# Patient Record
Sex: MALE | Race: WHITE | NOT HISPANIC OR LATINO | Employment: FULL TIME | URBAN - METROPOLITAN AREA
[De-identification: names, ages, dates, MRNs, and addresses within clinical notes are randomized per-mention and may not be internally consistent; named-entity substitution may affect disease eponyms.]

---

## 2020-10-02 ENCOUNTER — VIRTUAL VISIT (OUTPATIENT)
Dept: FAMILY MEDICINE | Facility: OTHER | Age: 35
End: 2020-10-02

## 2020-10-05 ENCOUNTER — AMBULATORY - HEALTHEAST (OUTPATIENT)
Dept: FAMILY MEDICINE | Facility: CLINIC | Age: 35
End: 2020-10-05

## 2020-10-05 DIAGNOSIS — Z20.822 SUSPECTED COVID-19 VIRUS INFECTION: ICD-10-CM

## 2020-10-07 NOTE — PROGRESS NOTES
"Date: 10/02/2020 20:31:13  Clinician: Latasha Todd  Clinician NPI: [id number]  Patient: Isaac Jeter  Patient : 1985  Patient Address: [address] [place] [address]  Patient Phone: [phone]  Visit Protocol: URI  Patient Summary:  Isaac is a 34 year old ( : 1985 ) male who initiated a [facility] Visit for COVID-19 (Coronavirus) evaluation and screening. When asked the question \"Please sign me up to receive news, health information and promotions. \", Isaac responded \"No\".    When asked when his symptoms started, Isaac reported that he does not have any symptoms.   He denies taking antibiotic medication in the past month and having recent facial or sinus surgery in the past 60 days.    Pertinent COVID-19 (Coronavirus) information  In the past 14 days, Isaac has not worked in a congregate living setting.   He does not work or volunteer as healthcare worker or a  and does not work or volunteer in a healthcare facility.   Isaca also has not lived in a congregate living setting in the past 14 days. He does not live with a healthcare worker.   Isaac has had a close contact with a laboratory-confirmed COVID-19 patient in the last 14 days. Additional information about contact with COVID-19 (Coronavirus) patient as reported by the patient (free text): Was exposed at a wedding reception on 2020.  Wedding reception was indoors.  I generally observed social distancing, but masks were not worn.  Infected individual is not known - bridge and groom reached out to all attendees indicating someone at the wedding reception later tested positive.   Patient reported they are not living in the same household with a COVID-19 positive patient.  Patient was in an enclosed space for greater than 15 minutes with a COVID-19 patient.  Since 2019, Isaac and has not had upper respiratory infection or influenza-like illness. Has not been diagnosed with lab-confirmed COVID-19 test   Pertinent " medical history  Isaac does not need a return to work/school note.   Weight: 140 lbs   Isaac does not smoke or use smokeless tobacco.   Weight: 140 lbs    MEDICATIONS: No current medications, ALLERGIES: NKDA  Clinician Response:  Dear Isaac,   Based on your exposure to COVID-19 (coronavirus), we would like to test you for this virus.  1. Please call [phone] to schedule your visit. Explain that you were referred by [facility] to have a COVID-19 test. Be ready to share your [facility] visit ID number.  The following will serve as your written order for this COVID Test, ordered by me, for the indication of suspected COVID [Z20.828]: The test will be ordered in [employer], our electronic health record, after you are scheduled. It will show as ordered and authorized by Jerry Rhodes MD.  Order: COVID-19 (coronavirus) PCR for ASYMPTOMATIC EXPOSURE testing from [facility].  If you know you have had close contact with someone who tested positive, you should be quarantined for 14 days after this exposure. You should stay in quarantine for the14 days even if the covid test is negative, the optimal time to test after exposure is 5-7 days from the exposure  Quarantine means   What should I do?  For safety, it's very important to follow these rules. Do this for 14 days after the date you were last exposed to the virus..  Stay home and away from others. Don't go to school or anywhere else. Generally quarantine means staying home from work but there are some exceptions to this. Please contact your workplace.   No hugging, kissing or shaking hands.  Don't let anyone visit.  Cover your mouth and nose with a mask, tissue or washcloth to avoid spreading germs.  Wash your hands and face often. Use soap and water.  What are the symptoms of COVID-19?  The most common symptoms are cough, fever and trouble breathing. Less common symptoms include headache, body aches, fatigue (feeling very tired), chills, sore throat, stuffy or runny nose, diarrhea (loose  poop), loss of taste or smell, belly pain, and nausea or vomiting (feeling sick to your stomach or throwing up).  After 14 days, if you have still don't have symptoms, you likely don't have this virus.  If you develop symptoms, follow these guidelines.  If you're normally healthy: Please start another [facility] visit to report your symptoms. Go to [email or web address].  If you have a serious health problem (like cancer, heart failure, an organ transplant or kidney disease): Call your specialty clinic. Let them know that you might have COVID-19.  2. When it's time for your COVID test:  Stay at least 6 feet away from others. (If someone will drive you to your test, stay in the backseat, as far away from the  as you can.)  Cover your mouth and nose with a mask, tissue or washcloth.  Go straight to the testing site. Don't make any stops on the way there or back.  Please note  Caregivers in these groups are at risk for severe illness due to COVID-19:  o People 65 years and older  o People who live in a nursing home or long-term care facility  o People with chronic disease (lung, heart, cancer, diabetes, kidney, liver, immunologic)  o People who have a weakened immune system, including those who:  Are in cancer treatment  Take medicine that weakens the immune system, such as corticosteroids  Had a bone marrow or organ transplant  Have an immune deficiency  Have poorly controlled HIV or AIDS  Are obese (body mass index of 40 or higher)  Smoke regularly  Where can I get more information?   [facility] -- About COVID-19: [email or web address]  CDC -- What to Do If You're Sick: www.cdc.gov/coronavirus/2019-ncov/about/steps-when-sick.html  CDC -- Ending Home Isolation: www.cdc.gov/coronavirus/2019-ncov/hcp/disposition-in-home-patients.html  CDC -- Caring for Someone: www.cdc.gov/coronavirus/2019-ncov/if-you-are-sick/care-for-someone.html  [facility] -- Interim Guidance for Hospital Discharge to Home:  www.health.[facility].mn.us/diseases/coronavirus/hcp/hospdischarge.pdf  [facility] clinical trials (COVID-19 research studies): clinicalaffairs.[facility]/umn-clinical-trials  Below are the COVID-19 hotlines at the [facility] ([facility]). Interpreters are available.  For health questions: Call 761-312-7772 or 1-234.287.8800 (7 a.m. to 7 p.m.)  For questions about schools and childcare: Call 876-179-4949 or 1-396.690.2890 (7 a.m. to 7 p.m.)    Diagnosis: Contact with and (suspected) exposure to other viral communicable diseases  Diagnosis ICD: Z20.828

## 2020-10-08 ENCOUNTER — COMMUNICATION - HEALTHEAST (OUTPATIENT)
Dept: SCHEDULING | Facility: CLINIC | Age: 35
End: 2020-10-08

## 2021-01-04 ENCOUNTER — HEALTH MAINTENANCE LETTER (OUTPATIENT)
Age: 36
End: 2021-01-04

## 2021-10-11 ENCOUNTER — HEALTH MAINTENANCE LETTER (OUTPATIENT)
Age: 36
End: 2021-10-11

## 2021-12-23 ENCOUNTER — TRANSFERRED RECORDS (OUTPATIENT)
Dept: HEALTH INFORMATION MANAGEMENT | Facility: CLINIC | Age: 36
End: 2021-12-23

## 2021-12-23 ENCOUNTER — OFFICE VISIT (OUTPATIENT)
Dept: FAMILY MEDICINE | Facility: CLINIC | Age: 36
End: 2021-12-23
Payer: OTHER GOVERNMENT

## 2021-12-23 VITALS
DIASTOLIC BLOOD PRESSURE: 84 MMHG | SYSTOLIC BLOOD PRESSURE: 118 MMHG | HEART RATE: 62 BPM | OXYGEN SATURATION: 97 % | WEIGHT: 157.5 LBS

## 2021-12-23 DIAGNOSIS — I49.3 PVC'S (PREMATURE VENTRICULAR CONTRACTIONS): Primary | ICD-10-CM

## 2021-12-23 DIAGNOSIS — R00.0 TACHYCARDIA: ICD-10-CM

## 2021-12-23 PROBLEM — I86.1 VARICOCELE: Status: ACTIVE | Noted: 2019-01-21

## 2021-12-23 LAB
ATRIAL RATE - MUSE: 62 BPM
DIASTOLIC BLOOD PRESSURE - MUSE: NORMAL MMHG
INTERPRETATION ECG - MUSE: NORMAL
P AXIS - MUSE: 73 DEGREES
PR INTERVAL - MUSE: 162 MS
QRS DURATION - MUSE: 86 MS
QT - MUSE: 404 MS
QTC - MUSE: 410 MS
R AXIS - MUSE: 82 DEGREES
SYSTOLIC BLOOD PRESSURE - MUSE: NORMAL MMHG
T AXIS - MUSE: 52 DEGREES
VENTRICULAR RATE- MUSE: 62 BPM

## 2021-12-23 PROCEDURE — 93005 ELECTROCARDIOGRAM TRACING: CPT | Performed by: FAMILY MEDICINE

## 2021-12-23 PROCEDURE — 99204 OFFICE O/P NEW MOD 45 MIN: CPT | Performed by: FAMILY MEDICINE

## 2021-12-23 PROCEDURE — 93010 ELECTROCARDIOGRAM REPORT: CPT | Performed by: INTERNAL MEDICINE

## 2021-12-23 NOTE — PATIENT INSTRUCTIONS
Patient Education     Understanding Tachycardia    The heart has an electrical system that sends signals to control the heartbeat. Any abnormal change in the speed or pattern of the heartbeat is called an arrhythmia. If you have an arrhythmia that causes the heart to beat faster than normal, this is known as tachycardia. There are many types of tachycardia. They can affect the heart s upper chambers (atria), the heart s lower chambers (ventricles), or both.  What causes tachycardia?  Many things can cause tachycardia, including:    Damage to heart tissue from heart disease, past heart attack, or heart surgery    Abnormal electrical pathways in the heart    Problems with the heart s structure that you are born with (congenital heart defect)    High blood pressure    Overactive thyroid    Use of certain medicines    Severe blood loss or anemia    Dehydration    Severe stress, fear, or anxiety    Smoking    Too much alcohol or caffeine    Abuse of certain street drugs, such as cocaine    Infections    Certain inflammatory conditions    Chronic  pain syndromes  What are the symptoms of tachycardia?  Tachycardia can cause a fast, pounding, or irregular heartbeat. It can also make it harder for the heart to pump blood efficiently to the body. This may cause symptoms such as:    Shortness of breath    Tiredness    Dizziness or fainting    Chest pain  Some people with tachycardia may have no symptoms at all.  How is tachycardia treated?  Treatment for tachycardia depends on the cause. It also depends on the type you have and how severe your symptoms are. Tachycardia in the ventricles is often more serious than in the atria. For this reason, it may need to be treated right away. Possible treatments include:    Treating the underlying cause. For instance, if a medicine is causing your tachycardia, changing the dosage or stopping the medicine with your doctor s guidance may correct the problem.    Lifestyle changes. These  include getting enough sleep and reducing stress. They also include avoiding caffeine, alcohol, tobacco, and illegal drugs.    Vagal maneuvers. These are techniques that may help interrupt a fast heartbeat and slow it down. One example is to take a deep breath and bear down hard while holding your breath.     Medicines. These may be used to help slow down a fast heartbeat. They may also be used to regulate the pattern of the heartbeat.    Electrical cardioversion. Special pads or paddles are used to send one or more brief  electrical shocks to the heart. This can help restore the heartbeat to normal.    Ablation. A long thin tube called a catheter is inserted into a blood vessel and threaded to the heart. The catheter sends out hot or cold energy to the areas causing abnormal signals. This destroys tissue or cells where the abnormal electrical signal originates. This may stop certain types of tachycardia.    Pacemaker. This is a device that is placed just under the skin in the chest. It sends paced signals to make the heart beat at a predetermined rate and rhythm. While it can't slow your heart rate, you may need this if certain medicines used to treat tachycardia result in a heart rate that is too slow .      Implantable cardioverter defibrillator (ICD).  This is a device that is placed just under the skin in the chest or armpit. The ICD monitors your heart rate. When needed, it sends controlled burst of signals to the heart to overdrive a tachycardia in the ventricles.  It can also send a single stronger shock to the heart to stop a life-threatening type of tachycardia, if needed.    Surgery.During surgery, different techniques may be used to create scar tissue in the areas of the heart causing abnormal signals. This may help stop certain types of tachycardia.  What are possible complications of tachycardia?   These can include:    Blood clots or stroke    Heart failure. With this problem, the heart muscle is so  "weak it no longer pumps blood well    Fainting    Sudden cardiac arrest. This is when the heart suddenly stops beating  When should I call my healthcare provider?   Call your healthcare provider right away if you have any of these:    Symptoms that don t get better with treatment, or get worse    New symptoms  Eda last reviewed this educational content on 5/1/2019 2000-2021 [employer]. All rights reserved. This information is not intended as a substitute for professional medical care. Always follow your healthcare professional's instructions.           Patient Education     Understanding Premature Ventricular Contractions (PVCs)  Premature ventricular contractions (PVCs) are a type of abnormal heartbeat (arrhythmia). They are commonly found in people of all ages.     How PVCs happen    Your heart has 4 chambers: 2 upper atria and 2 lower ventricles. Normally, a special group of \"pacemaker\" cells begins the signal to start your heartbeat. These cells are in the sinoatrial (SA) node in the right atrium. The signal quickly travels down your heart s conducting system. It moves to the left and right ventricle. As it travels, the signal triggers nearby parts of your heart to contract. This allows your heart to squeeze in a coordinated way.   During a PVC, the signal to start your heartbeat instead comes from one of the ventricles. This signal is premature, meaning it happens before the SA node has had a chance to fire. The signal spreads through the rest of your heart, causing a heartbeat. If this happens very soon after the previous heartbeat, your heart will push out very little blood. This causes a feeling of a pause between beats. If it happens a little later, your heart pushes out an almost normal amount of blood. This leads to a feeling of an extra heartbeat. So the heart has a  premature  heartbeat in between normal heartbeats.   What causes PVCs?  Certain things can help set off a premature " "signal in the ventricles. These include:    Advancing age    Reduced blood flow to your heart (such as coronary artery disease)    Scarring after a heart attack    Electrolyte problems, such as low sodium or potassium levels    Caffeine    Alcohol    Nicotine    Illegal drugs, such as cocaine and methamphetamine    Increased adrenaline, such as with anxiety    Certain medicines such as digoxin    Endocrine (hormone) problems, such as hyperthyroid (too much thyroid hormone)  Many heart conditions raise the risk for PVCs. These include:    High blood pressure    Heart attack    Coronary heart disease    Dilated cardiomyopathy    Hypertrophic cardiomyopathy    Congenital heart disease    Heart failure  They often happen in people without any heart disease. But PVCs are somewhat more common in people with some kind of heart disease.    What are the symptoms of PVCs?  Most people with occasional PVCs don t have symptoms. The more PVCs you have, the more likely you are to feel them. When symptoms do happen, they are usually minor. Symptoms may include:     An awareness of the heart beating    A fluttering or flip-flop feeling in your chest    Feeling of a \"skipped\" or \"extra\" heartbeat    Dizziness and near-fainting    A pulsing sensation in the neck  PVCs may cause more severe symptoms if you have another heart problem, such as heart failure.    How are PVCs diagnosed?  Your healthcare provider will ask about your medical history and give you a physical exam. An electrocardiogram (ECG) is the main test for diagnosis. This test records the electrical activity of your heart. During an ECG, small pads (electrodes) are placed on your chest, arms, and legs. Wires connect the pads to a machine, which records your heart s electrical signals. This test allows your provider to look at the signal of your heartbeat for a brief time. Any PVCs that occur during this time will show up on the ECG. In some cases, your healthcare " provider might advise ECG monitoring over a day or more, up to several years. This can help to diagnose PVCs that don t happen often. . There are several types of heart monitors:     Holter monitor. This monitor is a small box with wires connected to pads on your chest. You wear it for 1 to 2 days. It provides a constant recording of heart activity. After the test is done, your healthcare provider analyzes the recording.    Event monitors. These monitors are also small boxes with wires connected to pads on your chest. They are generally worn for 3 to 4 weeks. But they can be used for several months. One kind is a memory loop recorder. This monitor records constantly. But it stores the recording only when you press a button. The other kind is a credit card-sized recorder. This monitor is turned on only during an episode. With both types, you send the recordings of symptoms to your healthcare provider over the phone.    Mobile cardiac outpatient telemetry (MCOT).  This type of event monitor is usually used for 30 days. It uses cell phone technology to send data in real time to a monitoring center where trained technicians can review it. Or it can contact a healthcare provider for a life-threatening problem.    Patch monitor. This is a small self-contained adhesive patch that can record your heart rhythm for up to 2 weeks.    Insertable (or implantable) cardiac monitor (ICM).  This small device is implanted under the skin. It can be used to monitor the heart rhythm for several years.    Commercially available wearable heart rhythm monitors . These include smartwatches or wristbands. They may be useful for detecting abnormal heart rhythms.  These may be the only tests your healthcare provider will need. You may need more testing if you have PVCs often, or many in a row. Your provider may look at other causes, including possible heart problems. These tests might include:     Echocardiography. This test uses ultrasound to  evaluate your heart s structure and function.    Cardiac stress testing. This test checks how your heart responds to exercise and to evaluate heart artery blood flow.    Cardiac CT or MRI. These imaging tests make detailed pictures of the heart.    Blood tests. This is done to check electrolyte and thyroid levels.  Eda last reviewed this educational content on 7/1/2020 2000-2021 [employer]. All rights reserved. This information is not intended as a substitute for professional medical care. Always follow your healthcare professional's instructions.           Patient Education     Treatment for Premature Ventricular Contractions (PVCs)  Premature ventricular contractions (PVCs) are a type of abnormal heartbeat (arrhythmia). They are very common. They can occur in people of all ages from time to time. They usually cause no symptoms or only mild symptoms.  Types of treatment  Most people with PVCs don t need any treatment. If you are treated for another problem with your heart such as heart disease or heart failure, your PVCs may decrease. For example, you might take a medicine to lower your blood pressure. This may lower your rate of PVCs.  In some cases, specific treatment may be done to help prevent PVCs. These are used only if you have symptoms from PVCs. Choices include:    Medicines called beta-blockers    Other medicines to help prevent arrhythmias    Catheter ablation, a procedure to destroy the cells in the heart causing the abnormal beats  Living with PVCs  Your healthcare provider may give your more instructions about how to manage your PVCs, such as the following:    Eat a heart-healthy diet    Get enough exercise    Maintain a healthy weight    Don't drink too much alcohol or caffeine, which can trigger PVCs    Learn to manage stress and fatigue, which can also trigger PVCs    Get treatment for your other health conditions, such as high blood pressure    Make sure to keep all your  medical appointments    Check with your healthcare provider before taking any non-prescribed medicines including herbs, supplements, and recreational drugs which can over-excite the heart and trigger PVCs.  When to call your healthcare provider  Call your healthcare provider right away if you have any of these:    Symptoms that get worse over time    Severe symptoms such as chest pain, near-fainting, fainting, or sudden shortness of breath  [employer] last reviewed this educational content on 4/1/2018 2000-2021 [employer]. All rights reserved. This information is not intended as a substitute for professional medical care. Always follow your healthcare professional's instructions.

## 2021-12-23 NOTE — PROGRESS NOTES
Assessment/plan   Isaac Jeter is a 36 year old male who is New  patient to my practice here with chief complaint     Patient presents with:  Irregular Heart Beat: heart fluttering after getting his COVID booster December 9th.       Isaac was seen today for irregular heart beat.    Diagnoses and all orders for this visit:    PVC's (premature ventricular contractions)  Years old the patient the EKG came back in a very good range , most likely PVCs    tachycardia  Comments:  EKG normal sinus rhythm   Orders:  -     EKG 12-lead, tracing only  Preventive measures discussed including    Eat a heart-healthy diet    Get enough exercise    Maintain a healthy weight    Don't drink too much alcohol or caffeine, which can trigger PVCs    Learn to manage stress and fatigue, which can also trigger PVCs    Check with your healthcare provider before taking any non-prescribed medicines including herbs, supplements, and recreational drugs which can over-excite the heart and trigger PVCs.    Symptoms that get worse over time    Call Severe symptoms such as chest pain, near-fainting, fainting, or sudden shortness of breath             Subjective:      HPI: Isaac Jeter is a 36 year old male is here with CC heart skipping beats and palpitation since he got the covid booster Dec 9th , denies any chest pain pressure , fainting spells.  No prior history , otherwise healthy male work as a ,  2 young daughters.  Will be going for a preventive visit soon I did review his previous medical record previous cholesterol level which was done in 2019 to outside clinic was within normal limit    Wt Readings from Last 3 Encounters:   12/23/21 71.4 kg (157 lb 8 oz)           I have personally reviewed the patient's allergies, medications, past medical history, family history, social history, rooming notes and problem list in detail and updated the patient record as necessary.      No past medical history on file.  No past surgical  history on file.  Patient has no known allergies.  No current outpatient medications on file.     No family history on file.    Patient Active Problem List   Diagnosis     Varicocele       Review of Systems   12 point comprehensive review of systems was negative except as noted and HPI     Social History     Social History Narrative     Not on file       Objective:     Vitals:    12/23/21 1439   BP: 118/84   Pulse: 62   SpO2: 97%   Weight: 71.4 kg (157 lb 8 oz)       Physical Exam:   Physical Exam:  General Appearance:  Appears comfortable, Alert, cooperative, no distress,   Head: Normocephalic, without obvious abnormality, atraumatic  Eyes: PERRL, conjunctiva/corneas clear, EOM's intact, both eyes             Nose: Nares normal, no drainage   Throat: Lips, mucosa, and tongue normal; teeth and gums normal  Neck: Supple, symmetrical, trachea midline, no adenopathy;                      Lungs: Clear to auscultation bilaterally, respirations unlabored  Heart: Regular rate and rhythm, S1 and S2 normal, no murmur, rubs or gallop  Extremities: Extremities normal, atraumatic, no cyanosis or edema  Pulses: DP pulses are 1-2+ bilat.    Skin: no rashes or lesions        25 minutes spent on the day of encounter doing chart review, history and exam, documentation, and further activities as noted.     This note has been dictated using voice recognition software. Any grammatical or context distortions are unintentional and inherent to the software    Lissy Matthews MD

## 2022-01-30 ENCOUNTER — HEALTH MAINTENANCE LETTER (OUTPATIENT)
Age: 37
End: 2022-01-30

## 2022-09-24 ENCOUNTER — HEALTH MAINTENANCE LETTER (OUTPATIENT)
Age: 37
End: 2022-09-24

## 2023-05-08 ENCOUNTER — HEALTH MAINTENANCE LETTER (OUTPATIENT)
Age: 38
End: 2023-05-08

## 2023-08-24 ENCOUNTER — APPOINTMENT (OUTPATIENT)
Dept: URBAN - METROPOLITAN AREA CLINIC 260 | Age: 38
Setting detail: DERMATOLOGY
End: 2023-08-27

## 2023-08-24 VITALS — WEIGHT: 150 LBS | HEIGHT: 70 IN

## 2023-08-24 DIAGNOSIS — D18.0 HEMANGIOMA: ICD-10-CM

## 2023-08-24 DIAGNOSIS — D22 MELANOCYTIC NEVI: ICD-10-CM

## 2023-08-24 DIAGNOSIS — L81.4 OTHER MELANIN HYPERPIGMENTATION: ICD-10-CM

## 2023-08-24 DIAGNOSIS — L82.1 OTHER SEBORRHEIC KERATOSIS: ICD-10-CM

## 2023-08-24 DIAGNOSIS — L57.8 OTHER SKIN CHANGES DUE TO CHRONIC EXPOSURE TO NONIONIZING RADIATION: ICD-10-CM

## 2023-08-24 DIAGNOSIS — Z71.89 OTHER SPECIFIED COUNSELING: ICD-10-CM

## 2023-08-24 PROBLEM — D18.01 HEMANGIOMA OF SKIN AND SUBCUTANEOUS TISSUE: Status: ACTIVE | Noted: 2023-08-24

## 2023-08-24 PROBLEM — D22.5 MELANOCYTIC NEVI OF TRUNK: Status: ACTIVE | Noted: 2023-08-24

## 2023-08-24 PROCEDURE — OTHER COUNSELING: OTHER

## 2023-08-24 PROCEDURE — 99203 OFFICE O/P NEW LOW 30 MIN: CPT

## 2023-08-24 PROCEDURE — OTHER MIPS QUALITY: OTHER

## 2023-08-24 ASSESSMENT — LOCATION DETAILED DESCRIPTION DERM
LOCATION DETAILED: LEFT SUPERIOR MEDIAL UPPER BACK
LOCATION DETAILED: LEFT MEDIAL UPPER BACK

## 2023-08-24 ASSESSMENT — LOCATION ZONE DERM: LOCATION ZONE: TRUNK

## 2023-08-24 ASSESSMENT — LOCATION SIMPLE DESCRIPTION DERM: LOCATION SIMPLE: LEFT UPPER BACK

## 2024-07-14 ENCOUNTER — HEALTH MAINTENANCE LETTER (OUTPATIENT)
Age: 39
End: 2024-07-14